# Patient Record
Sex: FEMALE | Race: WHITE
[De-identification: names, ages, dates, MRNs, and addresses within clinical notes are randomized per-mention and may not be internally consistent; named-entity substitution may affect disease eponyms.]

---

## 2018-02-28 ENCOUNTER — HOSPITAL ENCOUNTER (OUTPATIENT)
Dept: HOSPITAL 76 - LAB.WCP | Age: 48
End: 2018-02-28
Attending: PHYSICIAN ASSISTANT
Payer: COMMERCIAL

## 2018-02-28 DIAGNOSIS — E78.5: ICD-10-CM

## 2018-02-28 DIAGNOSIS — Z00.00: Primary | ICD-10-CM

## 2018-02-28 LAB
ALBUMIN DIAFP-MCNC: 4.2 G/DL (ref 3.2–5.5)
ALBUMIN/GLOB SERPL: 1.2 {RATIO} (ref 1–2.2)
ALP SERPL-CCNC: 54 IU/L (ref 42–121)
ALT SERPL W P-5'-P-CCNC: 13 IU/L (ref 10–60)
ANION GAP SERPL CALCULATED.4IONS-SCNC: 9 MMOL/L (ref 6–13)
AST SERPL W P-5'-P-CCNC: 19 IU/L (ref 10–42)
BASOPHILS NFR BLD AUTO: 0.1 10^3/UL (ref 0–0.1)
BASOPHILS NFR BLD AUTO: 1.2 %
BILIRUB BLD-MCNC: 0.6 MG/DL (ref 0.2–1)
BUN SERPL-MCNC: 20 MG/DL (ref 6–20)
CALCIUM UR-MCNC: 9.1 MG/DL (ref 8.5–10.3)
CHLORIDE SERPL-SCNC: 103 MMOL/L (ref 101–111)
CHOLEST SERPL-MCNC: 289 MG/DL
CO2 SERPL-SCNC: 27 MMOL/L (ref 21–32)
CREAT SERPLBLD-SCNC: 0.7 MG/DL (ref 0.4–1)
EOSINOPHIL # BLD AUTO: 0.2 10^3/UL (ref 0–0.7)
EOSINOPHIL NFR BLD AUTO: 3.8 %
ERYTHROCYTE [DISTWIDTH] IN BLOOD BY AUTOMATED COUNT: 14.2 % (ref 12–15)
GFRSERPLBLD MDRD-ARVRAT: 89 ML/MIN/{1.73_M2} (ref 89–?)
GLOBULIN SER-MCNC: 3.6 G/DL (ref 2.1–4.2)
GLUCOSE SERPL-MCNC: 79 MG/DL (ref 70–100)
HDLC SERPL-MCNC: 68 MG/DL
HDLC SERPL: 4.3 {RATIO} (ref ?–4.4)
HGB UR QL STRIP: 12.9 G/DL (ref 12–16)
LDLC SERPL CALC-MCNC: 205 MG/DL
LDLC/HDLC SERPL: 3 {RATIO} (ref ?–4.4)
LYMPHOCYTES # SPEC AUTO: 2.1 10^3/UL (ref 1.5–3.5)
LYMPHOCYTES NFR BLD AUTO: 50.8 %
MCH RBC QN AUTO: 29.2 PG (ref 27–31)
MCHC RBC AUTO-ENTMCNC: 32.4 G/DL (ref 32–36)
MCV RBC AUTO: 90.1 FL (ref 81–99)
MONOCYTES # BLD AUTO: 0.3 10^3/UL (ref 0–1)
MONOCYTES NFR BLD AUTO: 8.4 %
NEUTROPHILS # BLD AUTO: 1.5 10^3/UL (ref 1.5–6.6)
NEUTROPHILS # SNV AUTO: 4.1 X10^3/UL (ref 4.8–10.8)
NEUTROPHILS NFR BLD AUTO: 35.8 %
PDW BLD AUTO: 7.1 FL (ref 7.9–10.8)
PLATELET # BLD: 348 10^3/UL (ref 130–450)
PROT SPEC-MCNC: 7.8 G/DL (ref 6.7–8.2)
RBC MAR: 4.4 10^6/UL (ref 4.2–5.4)
SODIUM SERPLBLD-SCNC: 139 MMOL/L (ref 135–145)
T4 FREE SERPL-MCNC: 0.92 NG/DL (ref 0.58–1.64)
TSH SERPL-ACNC: 0.33 UIU/ML (ref 0.34–5.6)
VLDLC SERPL-SCNC: 16 MG/DL

## 2018-02-28 PROCEDURE — 80053 COMPREHEN METABOLIC PANEL: CPT

## 2018-02-28 PROCEDURE — 85025 COMPLETE CBC W/AUTO DIFF WBC: CPT

## 2018-02-28 PROCEDURE — 84439 ASSAY OF FREE THYROXINE: CPT

## 2018-02-28 PROCEDURE — 80061 LIPID PANEL: CPT

## 2018-02-28 PROCEDURE — 83721 ASSAY OF BLOOD LIPOPROTEIN: CPT

## 2018-02-28 PROCEDURE — 84443 ASSAY THYROID STIM HORMONE: CPT

## 2018-02-28 PROCEDURE — 36415 COLL VENOUS BLD VENIPUNCTURE: CPT

## 2018-03-23 ENCOUNTER — HOSPITAL ENCOUNTER (OUTPATIENT)
Dept: HOSPITAL 76 - DI.N | Age: 48
Discharge: HOME | End: 2018-03-23
Attending: PHYSICIAN ASSISTANT
Payer: COMMERCIAL

## 2018-03-23 DIAGNOSIS — Z12.31: Primary | ICD-10-CM

## 2018-03-23 PROCEDURE — 77067 SCR MAMMO BI INCL CAD: CPT

## 2018-03-26 NOTE — MAMMOGRAPHY REPORT
DIGITAL SCREENING MAMMOGRAM:  03/23/2018

 

CLINICAL INDICATION:  A 48-year-old for screening.

 

COMPARISON:  04/2016, 03/2015, 01/2014, 04/2012, 01/2011.

 

TECHNIQUE:  Routine CC and MLO projections were obtained of the breasts.

 

FINDINGS:  Scattered fibroglandular tissue is present within the breasts.  There are no 

dominant masses, suspicious microcalcifications, or secondary signs of malignancy.  In 

comparison to the previous studies, there are no significant changes.

 

IMPRESSION:  NO MAMMOGRAPHIC EVIDENCE OF MALIGNANCY.  NO SIGNIFICANT INTERVAL CHANGES.

 

RECOMMENDATION:  Screening mammography is recommended annually.

 

BIRADS CATEGORY 1 - NEGATIVE.

 

STANDARD QUALIFYING STATEMENTS:

1.  This examination was reviewed with the aid of Computed-Aided Detection (CAD).

2.  A negative or benign imaging report should not delay biopsy if clinically suspicious 

findings are present.  Consider surgical consultation if warranted.  More than 5% of cancers 

are not identified by imaging.

3.  Dense breasts may obscure an underlying neoplasm.

 

 

DD: 03/26/2018 12:46

TD: 03/26/2018 13:04

Job #: [id number]

## 2019-10-03 ENCOUNTER — HOSPITAL ENCOUNTER (OUTPATIENT)
Dept: HOSPITAL 76 - LAB.WCP | Age: 49
End: 2019-10-03
Attending: PHYSICIAN ASSISTANT
Payer: COMMERCIAL

## 2019-10-03 DIAGNOSIS — E78.5: Primary | ICD-10-CM

## 2019-10-03 DIAGNOSIS — E55.9: ICD-10-CM

## 2019-10-03 DIAGNOSIS — Z00.00: ICD-10-CM

## 2019-10-03 LAB
ALBUMIN DIAFP-MCNC: 4.1 G/DL (ref 3.2–5.5)
ALBUMIN/GLOB SERPL: 1.2 {RATIO} (ref 1–2.2)
ALP SERPL-CCNC: 58 IU/L (ref 42–121)
ALT SERPL W P-5'-P-CCNC: 13 IU/L (ref 10–60)
ANION GAP SERPL CALCULATED.4IONS-SCNC: 8 MMOL/L (ref 6–13)
AST SERPL W P-5'-P-CCNC: 21 IU/L (ref 10–42)
BASOPHILS NFR BLD AUTO: 0 10^3/UL (ref 0–0.1)
BASOPHILS NFR BLD AUTO: 1 %
BILIRUB BLD-MCNC: 0.5 MG/DL (ref 0.2–1)
BUN SERPL-MCNC: 16 MG/DL (ref 6–20)
CALCIUM UR-MCNC: 8.9 MG/DL (ref 8.5–10.3)
CHLORIDE SERPL-SCNC: 104 MMOL/L (ref 101–111)
CHOLEST SERPL-MCNC: 214 MG/DL
CO2 SERPL-SCNC: 27 MMOL/L (ref 21–32)
CREAT SERPLBLD-SCNC: 0.7 MG/DL (ref 0.4–1)
EOSINOPHIL # BLD AUTO: 0.1 10^3/UL (ref 0–0.7)
EOSINOPHIL NFR BLD AUTO: 2.6 %
ERYTHROCYTE [DISTWIDTH] IN BLOOD BY AUTOMATED COUNT: 12.9 % (ref 12–15)
GFRSERPLBLD MDRD-ARVRAT: 88 ML/MIN/{1.73_M2} (ref 89–?)
GLOBULIN SER-MCNC: 3.5 G/DL (ref 2.1–4.2)
GLUCOSE SERPL-MCNC: 81 MG/DL (ref 70–100)
HDLC SERPL-MCNC: 65 MG/DL
HDLC SERPL: 3.3 {RATIO} (ref ?–4.4)
HGB UR QL STRIP: 13.5 G/DL (ref 12–16)
LDLC SERPL CALC-MCNC: 132 MG/DL
LDLC/HDLC SERPL: 2 {RATIO} (ref ?–4.4)
LYMPHOCYTES # SPEC AUTO: 1.6 10^3/UL (ref 1.5–3.5)
LYMPHOCYTES NFR BLD AUTO: 40.3 %
MCH RBC QN AUTO: 30.3 PG (ref 27–31)
MCHC RBC AUTO-ENTMCNC: 31.9 G/DL (ref 32–36)
MCV RBC AUTO: 94.8 FL (ref 81–99)
MONOCYTES # BLD AUTO: 0.4 10^3/UL (ref 0–1)
MONOCYTES NFR BLD AUTO: 10.9 %
NEUTROPHILS # BLD AUTO: 1.8 10^3/UL (ref 1.5–6.6)
NEUTROPHILS # SNV AUTO: 3.9 X10^3/UL (ref 4.8–10.8)
NEUTROPHILS NFR BLD AUTO: 45.2 %
PDW BLD AUTO: 8.9 FL (ref 7.9–10.8)
PLATELET # BLD: 323 10^3/UL (ref 130–450)
PROT SPEC-MCNC: 7.6 G/DL (ref 6.7–8.2)
RBC MAR: 4.46 10^6/UL (ref 4.2–5.4)
SODIUM SERPLBLD-SCNC: 139 MMOL/L (ref 135–145)
VLDLC SERPL-SCNC: 17 MG/DL

## 2019-10-03 PROCEDURE — 80061 LIPID PANEL: CPT

## 2019-10-03 PROCEDURE — 85025 COMPLETE CBC W/AUTO DIFF WBC: CPT

## 2019-10-03 PROCEDURE — 83721 ASSAY OF BLOOD LIPOPROTEIN: CPT

## 2019-10-03 PROCEDURE — 80053 COMPREHEN METABOLIC PANEL: CPT

## 2019-10-03 PROCEDURE — 84443 ASSAY THYROID STIM HORMONE: CPT

## 2019-10-03 PROCEDURE — 36415 COLL VENOUS BLD VENIPUNCTURE: CPT

## 2019-10-03 PROCEDURE — 82306 VITAMIN D 25 HYDROXY: CPT

## 2020-07-07 ENCOUNTER — HOSPITAL ENCOUNTER (OUTPATIENT)
Dept: HOSPITAL 76 - LAB.R | Age: 50
Discharge: HOME | End: 2020-07-07
Attending: PHYSICIAN ASSISTANT
Payer: COMMERCIAL

## 2020-07-07 DIAGNOSIS — R31.9: Primary | ICD-10-CM

## 2020-07-07 LAB
CLARITY UR REFRACT.AUTO: CLEAR
GLUCOSE UR QL STRIP.AUTO: NEGATIVE MG/DL
KETONES UR QL STRIP.AUTO: NEGATIVE MG/DL
NITRITE UR QL STRIP.AUTO: NEGATIVE
PH UR STRIP.AUTO: 7 PH (ref 5–7.5)
PROT UR STRIP.AUTO-MCNC: NEGATIVE MG/DL
RBC # UR STRIP.AUTO: (no result) /UL
RBC # URNS HPF: (no result) /HPF (ref 0–5)
SP GR UR STRIP.AUTO: 1.01 (ref 1–1.03)
SQUAMOUS URNS QL MICRO: (no result)
UROBILINOGEN UR QL STRIP.AUTO: (no result) E.U./DL
UROBILINOGEN UR STRIP.AUTO-MCNC: NEGATIVE MG/DL

## 2020-07-07 PROCEDURE — 87086 URINE CULTURE/COLONY COUNT: CPT

## 2020-07-07 PROCEDURE — 81001 URINALYSIS AUTO W/SCOPE: CPT

## 2020-07-07 PROCEDURE — 87181 SC STD AGAR DILUTION PER AGT: CPT

## 2020-08-26 ENCOUNTER — HOSPITAL ENCOUNTER (OUTPATIENT)
Dept: HOSPITAL 76 - COV | Age: 50
Discharge: HOME | End: 2020-08-26
Attending: NURSE PRACTITIONER
Payer: COMMERCIAL

## 2020-08-26 DIAGNOSIS — Z20.828: ICD-10-CM

## 2020-08-26 DIAGNOSIS — R19.7: Primary | ICD-10-CM

## 2021-04-09 ENCOUNTER — HOSPITAL ENCOUNTER (OUTPATIENT)
Dept: HOSPITAL 76 - LAB.R | Age: 51
Discharge: HOME | End: 2021-04-09
Attending: FAMILY MEDICINE
Payer: COMMERCIAL

## 2021-04-09 DIAGNOSIS — R35.0: Primary | ICD-10-CM

## 2021-04-09 PROCEDURE — 87077 CULTURE AEROBIC IDENTIFY: CPT

## 2021-04-09 PROCEDURE — 87086 URINE CULTURE/COLONY COUNT: CPT

## 2021-04-09 PROCEDURE — 87181 SC STD AGAR DILUTION PER AGT: CPT

## 2021-04-21 ENCOUNTER — HOSPITAL ENCOUNTER (OUTPATIENT)
Dept: HOSPITAL 76 - LAB.WCP | Age: 51
Discharge: HOME | End: 2021-04-21
Attending: PHYSICIAN ASSISTANT
Payer: COMMERCIAL

## 2021-04-21 DIAGNOSIS — E78.5: ICD-10-CM

## 2021-04-21 DIAGNOSIS — Z00.00: Primary | ICD-10-CM

## 2021-04-21 LAB
ALBUMIN DIAFP-MCNC: 4.2 G/DL (ref 3.2–5.5)
ALBUMIN/GLOB SERPL: 1.1 {RATIO} (ref 1–2.2)
ALP SERPL-CCNC: 72 IU/L (ref 42–121)
ALT SERPL W P-5'-P-CCNC: 17 IU/L (ref 10–60)
ANION GAP SERPL CALCULATED.4IONS-SCNC: 10 MMOL/L (ref 6–13)
AST SERPL W P-5'-P-CCNC: 21 IU/L (ref 10–42)
BASOPHILS NFR BLD AUTO: 0.1 10^3/UL (ref 0–0.1)
BASOPHILS NFR BLD AUTO: 1.3 %
BILIRUB BLD-MCNC: 0.6 MG/DL (ref 0.2–1)
BUN SERPL-MCNC: 16 MG/DL (ref 6–20)
CALCIUM UR-MCNC: 9.1 MG/DL (ref 8.5–10.3)
CHLORIDE SERPL-SCNC: 101 MMOL/L (ref 101–111)
CHOLEST SERPL-MCNC: 219 MG/DL
CO2 SERPL-SCNC: 30 MMOL/L (ref 21–32)
CREAT SERPLBLD-SCNC: 0.7 MG/DL (ref 0.4–1)
EOSINOPHIL # BLD AUTO: 0.2 10^3/UL (ref 0–0.7)
EOSINOPHIL NFR BLD AUTO: 3.4 %
ERYTHROCYTE [DISTWIDTH] IN BLOOD BY AUTOMATED COUNT: 12.6 % (ref 12–15)
GFRSERPLBLD MDRD-ARVRAT: 88 ML/MIN/{1.73_M2} (ref 89–?)
GLOBULIN SER-MCNC: 3.8 G/DL (ref 2.1–4.2)
GLUCOSE SERPL-MCNC: 96 MG/DL (ref 70–100)
HCT VFR BLD AUTO: 42 % (ref 37–47)
HDLC SERPL-MCNC: 71 MG/DL
HDLC SERPL: 3.1 {RATIO} (ref ?–4.4)
HGB UR QL STRIP: 13.4 G/DL (ref 12–16)
LDLC SERPL CALC-MCNC: 134 MG/DL
LDLC/HDLC SERPL: 1.9 {RATIO} (ref ?–4.4)
LYMPHOCYTES # SPEC AUTO: 2.1 10^3/UL (ref 1.5–3.5)
LYMPHOCYTES NFR BLD AUTO: 43.4 %
MCH RBC QN AUTO: 29.8 PG (ref 27–31)
MCHC RBC AUTO-ENTMCNC: 31.9 G/DL (ref 32–36)
MCV RBC AUTO: 93.3 FL (ref 81–99)
MONOCYTES # BLD AUTO: 0.5 10^3/UL (ref 0–1)
MONOCYTES NFR BLD AUTO: 10.5 %
NEUTROPHILS # BLD AUTO: 2 10^3/UL (ref 1.5–6.6)
NEUTROPHILS # SNV AUTO: 4.8 X10^3/UL (ref 4.8–10.8)
NEUTROPHILS NFR BLD AUTO: 41.4 %
NRBC # BLD AUTO: 0 /100WBC
NRBC # BLD AUTO: 0 X10^3/UL
PDW BLD AUTO: 8.8 FL (ref 7.9–10.8)
PLATELET # BLD: 330 10^3/UL (ref 130–450)
POTASSIUM SERPL-SCNC: 4.1 MMOL/L (ref 3.5–5)
PROT SPEC-MCNC: 8 G/DL (ref 6.7–8.2)
RBC MAR: 4.5 10^6/UL (ref 4.2–5.4)
SODIUM SERPLBLD-SCNC: 141 MMOL/L (ref 135–145)
TRIGL P FAST SERPL-MCNC: 72 MG/DL
TSH SERPL-ACNC: 0.5 UIU/ML (ref 0.34–5.6)
VLDLC SERPL-SCNC: 14 MG/DL

## 2021-04-21 PROCEDURE — 80053 COMPREHEN METABOLIC PANEL: CPT

## 2021-04-21 PROCEDURE — 80061 LIPID PANEL: CPT

## 2021-04-21 PROCEDURE — 84443 ASSAY THYROID STIM HORMONE: CPT

## 2021-04-21 PROCEDURE — 36415 COLL VENOUS BLD VENIPUNCTURE: CPT

## 2021-04-21 PROCEDURE — 83721 ASSAY OF BLOOD LIPOPROTEIN: CPT

## 2021-04-21 PROCEDURE — 85025 COMPLETE CBC W/AUTO DIFF WBC: CPT

## 2022-02-22 ENCOUNTER — HOSPITAL ENCOUNTER (OUTPATIENT)
Dept: HOSPITAL 76 - DI.N | Age: 52
Discharge: HOME | End: 2022-02-22
Attending: GENERAL ACUTE CARE HOSPITAL
Payer: COMMERCIAL

## 2022-02-22 DIAGNOSIS — Z12.31: Primary | ICD-10-CM

## 2022-02-23 NOTE — MAMMOGRAPHY REPORT
BILATERAL DIGITAL SCREENING MAMMOGRAM 3D/2D: 2/22/2022

 

CLINICAL: Routine screening.  

 

Comparison is made to exams dated:  3/23/2018 mammogram, 4/8/2016 mammogram, 3/17/2015 mammogram, 1/1
5/2014 mammogram, and 4/30/2012 mammogram - [facility].  There are scattered fibrog
landular elements in both breasts.  

 

No significant masses, calcifications, or other findings are seen in either breast.  

There has been no significant interval change.

 

IMPRESSION: NEGATIVE

There is no mammographic evidence of malignancy. A 1 year screening mammogram is recommended.  

 

 

This exam was interpreted at Station ID: [id number].  

 

NOTE: For mammograms, a report in lay terms will be sent to the patient. Approximately 15% of breast 
malignancies will not be visualized mammographically. In the management of a palpable breast mass, a 
negative mammogram must not discourage biopsy of a clinically suspicious lesion.

 

Electronically Signed By: Zacarias Conner          

acr/penrad:2/22/2022 16:33:46  

 

 

 

ACR BI-RADS Category 1: Negative 3341F

PARENCHYMAL PATTERN: (A) - The breast(s) demonstrate(s) scattered fibroglandular densities.

BI-RADS CATEGORY: (1) - 1

RECOMMENDATION: (ANNUAL)  - Recommend routine annual screening mammography.

[id number]

1 year screening

LATERALITY: (B)

## 2022-10-06 ENCOUNTER — HOSPITAL ENCOUNTER (OUTPATIENT)
Dept: HOSPITAL 76 - LAB.WCP | Age: 52
Discharge: HOME | End: 2022-10-06
Attending: NURSE PRACTITIONER
Payer: COMMERCIAL

## 2022-10-06 DIAGNOSIS — Z20.822: ICD-10-CM

## 2022-10-06 DIAGNOSIS — R42: Primary | ICD-10-CM

## 2023-04-17 ENCOUNTER — HOSPITAL ENCOUNTER (OUTPATIENT)
Dept: HOSPITAL 76 - LAB.N | Age: 53
Discharge: HOME | End: 2023-04-17
Attending: PHYSICIAN ASSISTANT
Payer: COMMERCIAL

## 2023-04-17 DIAGNOSIS — Z02.1: Primary | ICD-10-CM

## 2023-04-17 PROCEDURE — 86765 RUBEOLA ANTIBODY: CPT

## 2023-04-17 PROCEDURE — 81599 UNLISTED MAAA: CPT

## 2023-04-17 PROCEDURE — 86480 TB TEST CELL IMMUN MEASURE: CPT

## 2023-05-20 ENCOUNTER — HOSPITAL ENCOUNTER (OUTPATIENT)
Dept: HOSPITAL 76 - LAB.N | Age: 53
Discharge: HOME | End: 2023-05-20
Attending: PHYSICIAN ASSISTANT
Payer: COMMERCIAL

## 2023-05-20 DIAGNOSIS — Z02.1: Primary | ICD-10-CM

## 2023-05-20 PROCEDURE — 86765 RUBEOLA ANTIBODY: CPT

## 2023-05-20 PROCEDURE — 36415 COLL VENOUS BLD VENIPUNCTURE: CPT

## 2023-05-22 LAB — MEV IGG SER IA-ACNC: >300 AU/ML

## 2023-05-23 LAB — MEV IGM SER IA-ACNC: <0.91 ISR (ref 0–0.9)

## 2023-06-26 ENCOUNTER — HOSPITAL ENCOUNTER (OUTPATIENT)
Dept: HOSPITAL 76 - DI | Age: 53
Discharge: HOME | End: 2023-06-26
Attending: GENERAL ACUTE CARE HOSPITAL
Payer: COMMERCIAL

## 2023-06-26 ENCOUNTER — HOSPITAL ENCOUNTER (OUTPATIENT)
Dept: HOSPITAL 76 - DI | Age: 53
Discharge: HOME | End: 2023-06-26
Attending: PHYSICIAN ASSISTANT
Payer: COMMERCIAL

## 2023-06-26 DIAGNOSIS — M47.26: Primary | ICD-10-CM

## 2023-06-26 DIAGNOSIS — Z12.31: Primary | ICD-10-CM

## 2023-06-26 DIAGNOSIS — M51.16: ICD-10-CM

## 2023-06-26 NOTE — XRAY REPORT
PROCEDURE:  Lumbar Spine 2 View

 

INDICATIONS:  LOW BACK PAIN,CHRONIC

 

TECHNIQUE:  3 views of the lumbar spine were acquired.  

 

COMPARISON:  None.

 

FINDINGS:  

 

Bones:  5 non-rib-bearing vertebrae are present. Moderate leftward rotoscoliosis of the lumbar spine.
 There is moderate subluxation to the left of L3 on 4. AP alignment is relatively normal. There are p
rominent right-sided ridging endplate osteophytes from L1 to, and L2-3. Severe right-sided disc heigh
t loss at L3-4, moderate disc height loss L4-5 and L5-S1. Prominent facet hypertrophy L4-5 and L5-S1 
on the left. No vertebral body compression fractures.  No suspicious bony lesions.  

 

Soft tissues:  Overlying bowel gas pattern is normal.  No suspicious soft tissue calcifications.  Cho
lecystectomy clips.

 

IMPRESSION:  

1. Chronic appearing levoscoliosis with reactive facet arthropathy and endplate osteophytosis.

2. Moderate to severe multilevel disc height loss, many levels are asymmetric.

 

 

 

Reviewed by: Carla Ceja MD on 6/26/2023 12:07 PM PDT

Approved by: Carla Ceja MD on 6/26/2023 12:07 PM PDT

 

 

Station ID:  [id number]

## 2023-06-27 NOTE — MAMMOGRAPHY REPORT
BILATERAL DIGITAL SCREENING MAMMOGRAM 3D/2D: 6/26/2023

 

CLINICAL: Routine screening.  

 

Comparison is made to exams dated:  2/22/2022 mammogram, 3/23/2018 mammogram, 4/8/2016 mammogram, 3/1
7/2015 mammogram, and 1/15/2014 mammogram - [facility].  

 

There are scattered areas of fibroglandular density in both breasts (category b / 25%-50% glandular t
issue).  

 

No significant masses, calcifications, or other findings are seen in either breast.  

There has been no significant interval change.

 

IMPRESSION: NEGATIVE

There is no mammographic evidence of malignancy. A 1 year screening mammogram is recommended.  

 

Based on the Tyrer Cuzick model (a risk assessment model) the patients lifetime risk is 5.6% and her
 10 year risk is 1.7%. According to the ACR, ACS, and NCCN guidelines, an annual breast MRI exam dunia
g with mammogram is recommended if the patients lifetime risk is 20% or greater.

 

 

This exam was interpreted at Station ID: [id number].  

 

NOTE: For mammograms, a report in lay terms will be sent to the patient. Approximately 15% of breast 
malignancies will not be visualized mammographically. In the management of a palpable breast mass, a 
negative mammogram must not discourage biopsy of a clinically suspicious lesion.

 

Electronically Signed By: Branden velasco/sally:6/26/2023 10:39:04  

 

 

letter sent: No_Letter  

ACR BI-RADS Category 1: Negative 3341F

PARENCHYMAL PATTERN: (A) - The breast(s) demonstrate(s) scattered fibroglandular densities.

BI-RADS CATEGORY: (1) - 1

Mammogram

[id number]

1 year screening

LATERALITY: (B)

## 2023-06-29 ENCOUNTER — HOSPITAL ENCOUNTER (OUTPATIENT)
Dept: HOSPITAL 76 - DI | Age: 53
Discharge: HOME | End: 2023-06-29
Attending: PHYSICIAN ASSISTANT
Payer: COMMERCIAL

## 2023-06-29 ENCOUNTER — HOSPITAL ENCOUNTER (EMERGENCY)
Dept: HOSPITAL 76 - ED | Age: 53
Discharge: HOME | End: 2023-06-29
Payer: COMMERCIAL

## 2023-06-29 ENCOUNTER — HOSPITAL ENCOUNTER (OUTPATIENT)
Dept: HOSPITAL 76 - EMS | Age: 53
Discharge: ANOTHER HOSPITAL | End: 2023-06-29
Payer: COMMERCIAL

## 2023-06-29 VITALS — SYSTOLIC BLOOD PRESSURE: 168 MMHG | DIASTOLIC BLOOD PRESSURE: 104 MMHG

## 2023-06-29 DIAGNOSIS — M25.551: Primary | ICD-10-CM

## 2023-06-29 DIAGNOSIS — M16.11: Primary | ICD-10-CM

## 2023-06-29 DIAGNOSIS — M16.11: ICD-10-CM

## 2023-06-29 PROCEDURE — 99283 EMERGENCY DEPT VISIT LOW MDM: CPT

## 2023-06-29 PROCEDURE — 99284 EMERGENCY DEPT VISIT MOD MDM: CPT

## 2023-06-29 NOTE — XRAY REPORT
PROCEDURE:  Hip w/Pelvis 2-3V RT

 

INDICATIONS:  HIP PAIN,RIGHT

 

TECHNIQUE:  AP pelvis with lateral view(s) of the right hip(s).  

 

COMPARISON:  None.

 

FINDINGS:  

 

Bones:  No acute fractures or dislocations.  No suspicious bony lesions.   Mild degenerative changes 
of the bilateral hips. Moderate lower lumbar spondylosis.

 

Soft tissues:  No suspicious soft tissue calcifications or masses.  

 

 

IMPRESSION:  

No acute bony abnormality. Mild degenerative changes of the right hip.

 

If there is continued clinical concern for pathology or occult fracture, consider follow-up imaging w
ith repeat radiographs in 10-14 days and possible advanced imaging (CT, MRI, bone scan) if symptoms p
ersist.

 

Reviewed by: Branden Dudley MD on 6/29/2023 6:56 PM PDT

Approved by: Branden Dudley MD on 6/29/2023 6:56 PM PDT

 

 

Station ID:  [id number]

## 2023-06-29 NOTE — CT REPORT
PROCEDURE:  PELVIS WO

 

INDICATIONS:  intermittent right hip pain

 

TECHNIQUE:  

Noncontrast 3 mm axial sections acquired through the bony pelvis, with coronal and sagittal reformatt
ing. For radiation dose reduction, the following was used:  automated exposure control, adjustment of
 mA and/or kV according to patient size. 

 

COMPARISON:  None.

 

FINDINGS:  

Image quality: Good

 

Bones: Mild to moderate bilateral degenerative changes. Abductor enthesopathy bilaterally. Partially 
seen degenerative changes at the lumbosacral junction. No displaced fracture. No dislocation identifi
ed. A lucency is seen at the greater trochanter, without aggressive features.

 

Soft tissues: No drainable fluid collection. No suspicious calcifications. No gross abnormality in th
e partially visualized intrapelvic structures.

 

IMPRESSION:

No acute osseous abnormality identified on CT. Given reported history of acute on chronic pain, consi
stephanie MRI to further evaluate for soft tissue or nondisplaced injuries. 

 

Reviewed by: Amadou Kc MD on 6/29/2023 7:55 PM PDT

Approved by: Amadou Kc MD on 6/29/2023 7:55 PM PDT

 

 

Station ID:  [id number]

## 2023-06-29 NOTE — ED PHYSICIAN DOCUMENTATION
History of Present Illness





- Stated complaint


Stated Complaint: RIGHT HIP PAIN





- Chief complaint


Chief Complaint: Trauma Ext





- Additonal information


Additional information: 





53-year-old female presents emergency department for evaluation of acute 

intermittent right hip pain.  States the symptoms began now about 2 weeks ago.  

She states the pain is intermittent and she finds it mostly when she is sitting.

 If she is sitting in bed and tries to elevate her right leg into bed she finds 

he gets acutely worse.  She is recently underwent x-ray imaging of the lumbar 

spine and hip without acute findings though arthritis was noted.  She has been 

taking ibuprofen, Tylenol and a muscle relaxer without relief the pain.  No 

fevers.  No falls or trauma.  The pain was acutely worse this afternoon which 

she found intolerable thus she called EMS.  EMS placed an 18-gauge IV but by the

time that had been placed her pain had abated synovial analgesia was given.





Review of Systems


Constitutional: denies: Fever


Musculoskeletal: reports: Joint pain





PD PAST MEDICAL HISTORY





- Past Surgical History


Past Surgical History: Yes


General: Cholecystectomy





- Present Medications


Home Medications: 


                                Ambulatory Orders











 Medication  Instructions  Recorded  Confirmed


 


Ondansetron Odt [Zofran] 4 mg TL Q6H PRN #10 tablet 11/29/14 04/01/16


 


Meclizine [Antivert] 25 mg PO Q6H PRN #20 tablet 04/01/16 


 


diazePAM [Valium] 5 mg PO TID PRN #15 tablet 04/01/16 


 


ondansetron HCL [Zofran] 4 mg PO Q6HR PRN #14 tablet 04/01/16 














- Allergies


Allergies/Adverse Reactions: 


                                    Allergies











Allergy/AdvReac Type Severity Reaction Status Date / Time


 


No Known Drug Allergies Allergy   Verified 11/29/14 20:50














- Social History


Does the pt smoke?: No


Smoking Status: Never smoker


Does the pt drink ETOH?: No





PD ED PE EXPANDED





- General


General: Alert, No acute distress





- Extremities


Extremities: Right hip (Full active and passive range of motion of the right hip

 without any pain elicited.  Patient is able to sit upright without pain el

icited.  No pain in the midline spine or lower lumbar processes. No swelling.  

No tenderness elicited)





Results





- Vitals


Vitals: 


                               Vital Signs - 24 hr











  06/29/23





  19:00


 


Temperature 36.9 C


 


Heart Rate 87


 


Respiratory 18





Rate 


 


Blood Pressure 164/110 H


 


O2 Saturation 100








                                     Oxygen











O2 Source                      Room air

















- Rads (name of study)


  ** pelvic CT


Relevant Findings:: Final report received (No acute osseous abnormality 

identified on CT consider MRI to further evaluate for soft tissue or 

nondisplaced injuries)





PD Medical Decision Making





- ED course


Complexity details: reviewed results, re-evaluated patient, d/w patient


ED course: 





53-year-old female presents emergency department for evaluation of acute 

intermittent right hip pain that began about 2 weeks ago.  No falls or trauma.  

She finds that she has the positioning mostly when she is simply sitting.  She 

will have to stand or lay in order to get the pain to hipolito.  She had a flare of

 the pain today which she was unable to hipolito thus she summoned EMS.  She did 

have lumbar spine imaging as well as right hip imaging completed over the last 2

 days which showed no acute findings though there was some suggestion of 

arthritis.


At the time the patient presented here to the ER her pain had abated.  I was 

able to fully range the hip in all planes without eliciting any pain.  

Subsequently a CT of the hip was completed which did not show any acute obvious 

fracture or lytic lesions.  However given the history and nature of this pain I 

suspect she has a soft tissue injury or even a labral tear that may be causing 

this intermittent positional pain.  She is advised to follow closely with her 

PCP in order to obtain an outpatient MRI.  Given the lack of fevers or even 

micromotion tenderness I have lower suspicion for an infectious etiology.





Discharged home in stable condition with usual emergent return precautions for 

worsening symptoms discussed.





Departure





- Departure


Disposition: 01 Home, Self Care


Clinical Impression: 


 Acute right hip pain





Condition: Stable


Record reviewed to determine appropriate education?: Yes


Follow-Up: 


Nenita Hull PA-C [Provider Admit Priv/Credential] - 


Comments: 


As discussed at the bedside the CT scan of your hip does not show any obvious 

findings as to the cause of your pain.  You are developing arthritis within the 

joint but I suspect she will need an MRI of this hip.  It is possible that you 

have a soft tissue injury or even a labral tear that could be causing the 

intermittent positional pain.





Please discuss this ED visit with Katharine Hull your primary care doctor 

tomorrow to request the MRI.  Return immediately to the ER if you find that you 

are having worsening symptoms, pain event that you are unable to position 

yourself out of or develop any fevers.

## 2023-07-05 ENCOUNTER — HOSPITAL ENCOUNTER (OUTPATIENT)
Dept: HOSPITAL 76 - DI | Age: 53
Discharge: HOME | End: 2023-07-05
Attending: PHYSICIAN ASSISTANT
Payer: COMMERCIAL

## 2023-07-05 DIAGNOSIS — S73.101A: ICD-10-CM

## 2023-07-05 DIAGNOSIS — M16.11: Primary | ICD-10-CM

## 2023-07-05 DIAGNOSIS — S76.021A: ICD-10-CM

## 2023-07-05 NOTE — MRI REPORT
PROCEDURE:  HIP WO - RT

 

INDICATIONS:  RIGHT HIP PAIN

 

TECHNIQUE:  

Noncontrast coronal T1 spin echo and STIR through the bony pelvis.  Coronal and axial T2 fast spin ec
ho with fat saturation, sagittal T1 spin echo, and oblique axial T2 fast spin echo with fat saturatio
n through the hip.  

 

COMPARISON:  None.

 

FINDINGS:  

Image quality:  Excellent.  

 

Bones and joints: Mild periarticular osteophyte formation at the bilateral hip joints. Bone marrow of
 the pelvic ring and proximal femurs show normal signal throughout.  No intraosseous lesions or fract
ures.  No avascular necrosis of the femoral heads.  The visualized lower lumbar spine appears normall
y aligned.  

 

Tendons:  The gluteus medius and minimus tendons appear intact, without associated muscle atrophy. Th
ere is a small amount of fluid signal within the femoral insertion sites of the gluteus medius and mi
nimus tendons. The iliopsoas tendon appears intact, without adjacent bursal fluid collections.  The o
rigin of the hamstring tendon is intact at the ischial tuberosity.  

 

Labrum and cartilage: There is linear high T2 signal intensity undercutting the superolateral labrum.
 Cartilage surface of the femoral head appears of normal thickness.  The alpha angle of the femur is 
within normal limits at less than 55 degrees.  

 

Soft tissues:  Visualized muscles demonstrate normal bulk and internal signal.  The proximal sciatic 
neurovascular bundle appears normal adjacent to the hamstring tendons.  No free pelvic fluid.  Bladde
r wall thickness is normal.  Genitourinary structures and bowel loops appear normal where visualized.
  

 

IMPRESSION:  

1. 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

2. Partial-thickness tearing of the right gluteus medius and minimus tendons.

 

Reviewed by: Johnna Molina MD on 7/5/2023 8:43 AM PDT

Approved by: Johnna Molina MD on 7/5/2023 8:43 AM PDT

 

 

Station ID:  [id number]

## 2023-12-16 ENCOUNTER — HOSPITAL ENCOUNTER (EMERGENCY)
Dept: HOSPITAL 76 - ED | Age: 53
Discharge: HOME | End: 2023-12-16
Payer: COMMERCIAL

## 2023-12-16 VITALS — OXYGEN SATURATION: 96 % | DIASTOLIC BLOOD PRESSURE: 78 MMHG | SYSTOLIC BLOOD PRESSURE: 132 MMHG
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
## 2023-12-16 DIAGNOSIS — S06.0X0A: ICD-10-CM

## 2023-12-16 DIAGNOSIS — S43.401A: ICD-10-CM

## 2023-12-16 DIAGNOSIS — S50.02XA: Primary | ICD-10-CM

## 2023-12-16 DIAGNOSIS — S16.1XXA: ICD-10-CM
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
## 2023-12-16 DIAGNOSIS — S70.02XA: ICD-10-CM
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
## 2023-12-16 DIAGNOSIS — S00.83XA: ICD-10-CM

## 2023-12-16 PROCEDURE — 99283 EMERGENCY DEPT VISIT LOW MDM: CPT

## 2023-12-16 PROCEDURE — 99284 EMERGENCY DEPT VISIT MOD MDM: CPT

## 2023-12-16 NOTE — ED PHYSICIAN DOCUMENTATION
PD HPI Fall





- Stated complaint


Stated Complaint: GLF/HEAD INJ





- Chief complaint


Chief Complaint: Trauma Ch/Bk





- History obtained from


History obtained from: Patient





- History of Present Illness


Mechanism of injury: Slipped


Fall distance: Standing position


Where injury occurred: Home


Timing - onset: Enter  time (1400), Today


Injury(ies) location: Head, Neck, Right Upper Extremity, Left Uppper Extremity, 

Left Lower Extremity


Quality of pain: Pain, Throbbing


Associated symptoms: Other (headache).  No: LOC, AMS, Amnesia, Seizures, Ear 

drainage, Nasal drainage, Neck pain, Weakness, Paresthesias, Dyspnea, Nausea / 

vomiting, Hematemesis, Abdominal distension


Symptoms improve with: Rest


Worsens with: Movement, Palpation


Contributing factors: No: Anticoagulated, Intoxicated


Similar symptoms before: Has not had sx before


Recently seen: Not recently seen





- Additional information


Additional information: 





53-year-old Harsh Lamar was in her home attempting to carry a tabletop down 

the stairs when she slipped on the stairs and the tabletop hit her on the top of

the head she struck her left elbow wrenched her right shoulder and struck the 

posterior aspect of her left hip.  She has pain to ambulation she has pain with 

range of motion of the shoulder and pain to direct palpation of the elbow.  She 

complains of pain to her neck with any movement of her head and she has a 

hematoma to her forehead and a headache.  She denies loss of consciousness she 

denies nausea, difficulty concentrating or dizziness.





Review of Systems


Constitutional: denies: Fever


Eyes: denies: Decreased vision


Ears: denies: Ear pain


Nose: denies: Congestion


Throat: denies: Sore throat


Cardiac: denies: Chest pain / pressure


Respiratory: denies: Dyspnea, Cough


GI: denies: Abdominal Pain, Nausea, Vomiting, Constipation, Diarrhea


: denies: Dysuria, Frequency





PD PAST MEDICAL HISTORY





- Past Medical History


Past Medical History: No





- Past Surgical History


Past Surgical History: Yes


General: Cholecystectomy





- Present Medications


Home Medications: 


                                Ambulatory Orders











 Medication  Instructions  Recorded  Confirmed


 


Ondansetron Odt [Zofran] 4 mg TL Q6H PRN #10 tablet 11/29/14 04/01/16


 


Meclizine [Antivert] 25 mg PO Q6H PRN #20 tablet 04/01/16 


 


diazePAM [Valium] 5 mg PO TID PRN #15 tablet 04/01/16 


 


ondansetron HCL [Zofran] 4 mg PO Q6HR PRN #14 tablet 04/01/16 














- Allergies


Allergies/Adverse Reactions: 


                                    Allergies











Allergy/AdvReac Type Severity Reaction Status Date / Time


 


No Known Drug Allergies Allergy   Verified 12/16/23 15:05














- Social History


Does the pt smoke?: No


Smoking Status: Never smoker


Does the pt drink ETOH?: No


Does the pt have substance abuse?: No





- Immunizations


Immunizations are current?: Yes





- POLST


Patient has POLST: No





PD ED PE NORMAL





- Vitals


Vital signs reviewed: Yes (hypertensive mild )





- General


General: Alert and oriented X 3, No acute distress, Well developed/nourished





- HEENT


HEENT: PERRL, EOMI, Other (hematoma to the left forehead)





- Neck


Neck: Supple, no meningeal sign, Other (mild kelly tenderness to the lower cervi

annie spine )





- Cardiac


Cardiac: RRR, No murmur





- Respiratory


Respiratory: No respiratory distress, Clear bilaterally





- Abdomen


Abdomen: Soft, Non tender





- Back


Back: No CVA TTP, No spinal TTP





- Derm


Derm: Normal color, Warm and dry, No rash





- Extremities


Extremities: No deformity, Other (tenderness anteriorly to the right shoulder 

with preserved range of motion. bruising and ecchymosis to the olecrenon on L 

normal ROM. tenderness to the left trochanter posteriorly .)





- Neuro


Neuro: Alert and oriented X 3, CNs 2-12 intact, No motor deficit, No sensory 

deficit, Normal speech


Eye Opening: Spontaneous


Motor: Obeys Commands


Verbal: Oriented


GCS Score: 15





- Psych


Psych: Normal mood, Normal affect





Results





- Vitals


Vitals: 


                               Vital Signs - 24 hr











  12/16/23 12/16/23 12/16/23





  15:00 15:55 17:37


 


Temperature 35.8 C L  


 


Heart Rate 69 68 71


 


Respiratory 14 14 18





Rate   


 


Blood Pressure 143/88 H 140/89 H 137/70 H


 


O2 Saturation 99 97 99








                                     Oxygen











O2 Source                      Room air

















- Rads (name of study)


  ** CT head


Relevant Findings:: Prelim report reviewed (Impression: No acute intracranial 

pathology no intracranial hemorrhage is seen.), EMP independent interpretation 

of test, See rad report





  ** CT cervical spine


Relevant Findings:: Prelim report reviewed, EMP independent interpretation of 

test, See rad report





PD Medical Decision Making





- ED course


Complexity details: reviewed results, re-evaluated patient, considered 

differential, d/w patient


ED course: 





52 y/o female with a fall down stairs and a contusion to the forehead has 

multiple injuries none of which appear significant. She is fully evaluated and 

imaging is obtained and is negative for specific findings. She is discharged to 

home with conservative management. She refuses narcotic pain reliever. 





Departure





- Departure


Disposition: 01 Home, Self Care


Clinical Impression: 


Sprain of right shoulder joint


Qualifiers:


 Encounter type: initial encounter Shoulder sprain type: unspecified sprain 

Qualified Code(s): S43.401A - Unspecified sprain of right shoulder joint, 

initial encounter





Left elbow contusion


Qualifiers:


 Encounter type: initial encounter Qualified Code(s): S50.02XA - Contusion of 

left elbow, initial encounter





Contusion, hip


Qualifiers:


 Encounter type: initial encounter Laterality: left Qualified Code(s): S70.02XA 

- Contusion of left hip, initial encounter





Concussion


Qualifiers:


 Encounter type: initial encounter Loss of consciousness presence/duration: 

without LOC Qualified Code(s): S06.0X0A - Concussion without loss of 

consciousness, initial encounter





Cervical strain, acute


Qualifiers:


 Encounter type: initial encounter Qualified Code(s): S16.1XXA - Strain of 

muscle, fascia and tendon at neck level, initial encounter





Condition: Stable


Instructions:  ED Concussion, ED Contusion Elbow, ED Contusion Hip, ED Sprain 

Strain Neck, ED Sprain Shoulder


Follow-Up: 


Nenita Hull PA-C [Provider Admit Priv/Credential] -

## 2023-12-16 NOTE — XRAY REPORT
PROCEDURE:  Shoulder 3 View RT

 

INDICATIONS:  fall anterior pain

 

TECHNIQUE:  3 views of the shoulder were acquired.  

 

COMPARISON:  Correlation is made with the accompanying plain films.

 

FINDINGS:  

 

Bones:  No fractures or dislocations.  No suspicious bony lesions.  Visualized ribs appear intact.  T
here are degenerative changes seen, including involving the coracoclavicular joint.

 

Soft tissues:  No suspicious soft tissue calcifications.  The visualized lungs are within normal limi
ts.  

 

 

IMPRESSION:  

 

No acute bony abnormality is seen on these plain films.

 

Please correlate with focal tenderness. If there is point tenderness (or other clinical concern for a
 fracture not seen on these plain films) then please consider a dedicated CT study or a short term fo
llow up plain film series for further evaluation.  

 

 

 

Reviewed by: Augustus Cervantes MD on 12/16/2023 3:45 PM [facility]

Approved by: Augustus Cervantes MD on 12/16/2023 3:45 PM [facility]

 

 

Station ID:  [id number]

## 2023-12-16 NOTE — XRAY REPORT
PROCEDURE:  Elbow 3 View LT

 

INDICATIONS:  fall olecrenon bruising/pain

 

TECHNIQUE:  3 views of the elbow were acquired.  

 

COMPARISON:  Correlation is made with the accompanying imaging.

 

FINDINGS:  

 

Bones:  No acute appearing fractures or dislocations. Chronic appearing fragmentation can be seen inv
olving the medial epicondyle.  No suspicious bony lesions.  

 

Soft tissues:   No effusion. No suspicious soft tissue calcifications or masses.  

 

 

IMPRESSION:  

 

No acute plain film abnormality is seen.

 

Chronic appearing fragmentation can be seen involving the medial epicondyle. Please correlate with pr
ior history of epicondylitis.

 

 

 

Reviewed by: Augustus Cervantes MD on 12/16/2023 3:46 PM [place]

Approved by: Augustus Cervantes MD on 12/16/2023 3:46 PM [facility]

 

 

Station ID:  [id number]

## 2023-12-16 NOTE — CT REPORT
PROCEDURE:  CERVICAL SPINE WO

 

INDICATIONS:  fal head injury neck pain

 

TECHNIQUE:  

Noncontrast 3 mm thick sections acquired from the skull base to the T4 level.  Sagittal and coronal r
eformats were then constructed.  For radiation dose reduction, the following was used:  automated exp
osure control, adjustment of mA and/or kV according to patient size.

 

COMPARISON:  Correlation is made with the accompanying head CT.

 

FINDINGS:  

Image quality:  Excellent.  

 

Bones:  No fractures or dislocations.  Visualized superior ribs are intact.  There is moderate severe
 disc space narrowing seen at C5-C6 and at C6-C7. Multiple levels of facet hypertrophy can be seen.

 

Soft tissues:  Prevertebral soft tissues are normal in thickness.  No paravertebral hematomas.  No ap
ical pneumothoraces.  

 

 

 

 

IMPRESSION:  

 

Negative for cervical spine fracture.

 

Cervical spine degenerative changes are seen, which are worst inferiorly.

 

 

 

Reviewed by: Augustus Cervantes MD on 12/16/2023 4:44 PM [facility]

Approved by: Augustus Cervantes MD on 12/16/2023 4:44 PM [facility]

 

 

Station ID:  [id number]

## 2023-12-16 NOTE — CT REPORT
PROCEDURE:  HEAD WO

 

INDICATIONS:  concussion hematoma headache

 

TECHNIQUE:  

Noncontrast 4.5 mm thick angled axial sections acquired from the foramen magnum to the vertex.  For r
adiation dose reduction, the following was used:  automated exposure control, adjustment of mA and/or
 kV according to patient size.

 

COMPARISON:  10/28/2013. Correlation is made with the accompanying cervical spine CT.

 

FINDINGS:  

Image quality:  Excellent.  

 

CSF spaces:  Basal cisterns are patent.  No extra-axial fluid collections.  Ventricles are normal in 
size and shape.  

 

Brain:  No midline shift.  No intracranial masses or hemorrhage.  Gray-white matter interface is norm
al.  

 

Skull and face:  Calvarium and visualized facial bones are intact, without suspicious lesions.  

 

Sinuses:  Visualized sinuses and mastoids are clear.  

 

 

IMPRESSION:  

 

No acute intracranial pathology.

 

No intracranial hemorrhage is seen.   

 

 

 

 

Reviewed by: Augustus Cervantes MD on 12/16/2023 4:43 PM [facility]

Approved by: Augustus Cervantes MD on 12/16/2023 4:43 PM [facility]

 

 

Station ID:  [id number]

## 2023-12-16 NOTE — XRAY REPORT
PROCEDURE:  Hip w/Pelvis 2-3V LT

 

INDICATIONS:  fall down stairs pain posterior to trochanter

 

TECHNIQUE:  AP pelvis with lateral view(s) of the left hip(s).  

 

COMPARISON:  Correlation is made with the accompanying plain films. Correlation is also made with [facility] hip plain films, 6/29/2023.

 

FINDINGS:  

 

Bones:  No fractures or dislocations.  No suspicious bony lesions. Degenerative changes are seen thro
ughout, including involving the visualized lower lumbar spine.   

 

Soft tissues:  No suspicious soft tissue calcifications or masses.  

 

 

IMPRESSION:  

 

No acute bony abnormality is seen by plain film.

 

 

 

Reviewed by: Augustus Cervantes MD on 12/16/2023 3:44 PM [facility]

Approved by: Augustus Cervantes MD on 12/16/2023 3:44 PM [facility]

 

 

Station ID:  [id number]

## 2024-06-24 ENCOUNTER — HOSPITAL ENCOUNTER (OUTPATIENT)
Dept: HOSPITAL 76 - LAB | Age: 54
Discharge: HOME | End: 2024-06-24
Attending: PHYSICIAN ASSISTANT
Payer: COMMERCIAL

## 2024-06-24 DIAGNOSIS — Z00.00: Primary | ICD-10-CM

## 2024-06-24 DIAGNOSIS — E55.9: ICD-10-CM

## 2024-06-24 DIAGNOSIS — R53.83: ICD-10-CM

## 2024-06-24 LAB
ALBUMIN DIAFP-MCNC: 4.2 G/DL (ref 3.2–5.5)
ALBUMIN/GLOB SERPL: 1.4 {RATIO} (ref 1–2.2)
ALP SERPL-CCNC: 69 IU/L (ref 42–121)
ALT SERPL W P-5'-P-CCNC: 13 IU/L (ref 10–60)
ANION GAP SERPL CALCULATED.4IONS-SCNC: 4 MMOL/L (ref 6–13)
AST SERPL W P-5'-P-CCNC: 24 IU/L (ref 10–42)
BASOPHILS NFR BLD AUTO: 0 10^3/UL (ref 0–0.1)
BASOPHILS NFR BLD AUTO: 0.9 %
BILIRUB BLD-MCNC: 0.5 MG/DL (ref 0.2–1)
BUN SERPL-MCNC: 20 MG/DL (ref 6–20)
CALCIUM UR-MCNC: 9.6 MG/DL (ref 8.5–10.3)
CHLORIDE SERPL-SCNC: 102 MMOL/L (ref 101–111)
CHOLEST SERPL-MCNC: 190 MG/DL
CO2 SERPL-SCNC: 30 MMOL/L (ref 21–32)
CREAT SERPLBLD-SCNC: 0.7 MG/DL (ref 0.6–1.3)
EOSINOPHIL # BLD AUTO: 0.1 10^3/UL (ref 0–0.7)
EOSINOPHIL NFR BLD AUTO: 2.9 %
ERYTHROCYTE [DISTWIDTH] IN BLOOD BY AUTOMATED COUNT: 12.2 % (ref 12–15)
FERRITIN SERPL-SCNC: 34.7 NG/ML (ref 11–306.8)
GFRSERPLBLD MDRD-ARVRAT: 87 ML/MIN/{1.73_M2} (ref 89–?)
GLOBULIN SER-MCNC: 3.1 G/DL (ref 2.1–4.2)
GLUCOSE SERPL-MCNC: 101 MG/DL (ref 74–104)
HCT VFR BLD AUTO: 40.6 % (ref 37–47)
HDLC SERPL-MCNC: 55 MG/DL
HDLC SERPL: 3.5 {RATIO} (ref ?–4.4)
HGB UR QL STRIP: 13.1 G/DL (ref 12–16)
LDLC SERPL CALC-MCNC: 108 MG/DL
LDLC/HDLC SERPL: 2 {RATIO} (ref ?–4.4)
LYMPHOCYTES # SPEC AUTO: 2 10^3/UL (ref 1.5–3.5)
LYMPHOCYTES NFR BLD AUTO: 44.4 %
MCH RBC QN AUTO: 30.1 PG (ref 27–31)
MCHC RBC AUTO-ENTMCNC: 32.3 G/DL (ref 32–36)
MCV RBC AUTO: 93.3 FL (ref 81–99)
MONOCYTES # BLD AUTO: 0.4 10^3/UL (ref 0–1)
MONOCYTES NFR BLD AUTO: 9.9 %
NEUTROPHILS # BLD AUTO: 1.9 10^3/UL (ref 1.5–6.6)
NEUTROPHILS # SNV AUTO: 4.5 X10^3/UL (ref 4.8–10.8)
NEUTROPHILS NFR BLD AUTO: 41.7 %
NRBC # BLD AUTO: 0 /100WBC
NRBC # BLD AUTO: 0 X10^3/UL
PDW BLD AUTO: 8.5 FL (ref 7.9–10.8)
PLATELET # BLD: 336 10^3/UL (ref 130–450)
POTASSIUM SERPL-SCNC: 4.4 MMOL/L (ref 3.5–4.5)
PROT SPEC-MCNC: 7.3 G/DL (ref 6.4–8.9)
RBC MAR: 4.35 10^6/UL (ref 4.2–5.4)
SODIUM SERPLBLD-SCNC: 136 MMOL/L (ref 135–145)
TRIGL P FAST SERPL-MCNC: 134 MG/DL (ref 48–352)
TSH SERPL-ACNC: 0.61 UIU/ML (ref 0.34–5.6)
VLDLC SERPL-SCNC: 27 MG/DL

## 2024-06-24 PROCEDURE — 80061 LIPID PANEL: CPT

## 2024-06-24 PROCEDURE — 36415 COLL VENOUS BLD VENIPUNCTURE: CPT

## 2024-06-24 PROCEDURE — 82306 VITAMIN D 25 HYDROXY: CPT

## 2024-06-24 PROCEDURE — 83721 ASSAY OF BLOOD LIPOPROTEIN: CPT

## 2024-06-24 PROCEDURE — 80053 COMPREHEN METABOLIC PANEL: CPT

## 2024-06-24 PROCEDURE — 84443 ASSAY THYROID STIM HORMONE: CPT

## 2024-06-24 PROCEDURE — 85025 COMPLETE CBC W/AUTO DIFF WBC: CPT

## 2024-06-24 PROCEDURE — 82607 VITAMIN B-12: CPT

## 2024-06-24 PROCEDURE — 82728 ASSAY OF FERRITIN: CPT
